# Patient Record
Sex: FEMALE | Race: WHITE | NOT HISPANIC OR LATINO | Employment: FULL TIME | RURAL
[De-identification: names, ages, dates, MRNs, and addresses within clinical notes are randomized per-mention and may not be internally consistent; named-entity substitution may affect disease eponyms.]

---

## 2021-02-11 ENCOUNTER — HISTORICAL (OUTPATIENT)
Dept: ADMINISTRATIVE | Facility: HOSPITAL | Age: 56
End: 2021-02-11

## 2021-02-12 LAB
INSULIN SERPL-ACNC: NORMAL U[IU]/ML

## 2021-11-22 ENCOUNTER — HOSPITAL ENCOUNTER (OUTPATIENT)
Dept: RADIOLOGY | Facility: HOSPITAL | Age: 56
Discharge: HOME | End: 2021-11-22

## 2021-11-22 VITALS — HEIGHT: 66 IN | WEIGHT: 184 LBS | BODY MASS INDEX: 29.57 KG/M2

## 2021-11-22 DIAGNOSIS — Z12.31 VISIT FOR SCREENING MAMMOGRAM: ICD-10-CM

## 2021-11-22 DIAGNOSIS — Z09 FOLLOW-UP EXAM: ICD-10-CM

## 2021-11-22 PROCEDURE — 77066 DX MAMMO INCL CAD BI: CPT | Mod: 26,,, | Performed by: RADIOLOGY

## 2021-11-22 PROCEDURE — 77066 DX MAMMO INCL CAD BI: CPT | Mod: TC

## 2021-11-22 PROCEDURE — 77066 MAMMO DIGITAL DIAGNOSTIC BILAT: ICD-10-PCS | Mod: 26,,, | Performed by: RADIOLOGY

## 2023-01-18 ENCOUNTER — HOSPITAL ENCOUNTER (OUTPATIENT)
Dept: RADIOLOGY | Facility: HOSPITAL | Age: 58
Discharge: HOME | End: 2023-01-18
Attending: RADIOLOGY

## 2023-01-18 DIAGNOSIS — R92.8 ABNORMAL MAMMOGRAM: ICD-10-CM

## 2023-01-18 DIAGNOSIS — Z12.31 VISIT FOR SCREENING MAMMOGRAM: ICD-10-CM

## 2023-01-18 PROCEDURE — 77067 SCR MAMMO BI INCL CAD: CPT | Mod: 26,,, | Performed by: RADIOLOGY

## 2023-01-18 PROCEDURE — 77067 SCR MAMMO BI INCL CAD: CPT | Mod: TC

## 2023-01-18 PROCEDURE — 76641 ULTRASOUND BREAST COMPLETE: CPT | Mod: TC,50

## 2023-01-18 PROCEDURE — 77067 MAMMO DIGITAL SCREENING BILAT: ICD-10-PCS | Mod: 26,,, | Performed by: RADIOLOGY

## 2023-01-18 PROCEDURE — 76641 US BREAST BILATERAL COMPLETE: ICD-10-PCS | Mod: 26,50,, | Performed by: RADIOLOGY

## 2023-01-18 PROCEDURE — 76641 ULTRASOUND BREAST COMPLETE: CPT | Mod: 26,50,, | Performed by: RADIOLOGY

## 2024-05-15 ENCOUNTER — HOSPITAL ENCOUNTER (OUTPATIENT)
Dept: RADIOLOGY | Facility: HOSPITAL | Age: 59
Discharge: HOME | End: 2024-05-15
Attending: RADIOLOGY
Payer: COMMERCIAL

## 2024-05-15 DIAGNOSIS — N63.10 BREAST MASS, RIGHT: Primary | ICD-10-CM

## 2024-05-15 DIAGNOSIS — R92.8 ABNORMAL MAMMOGRAM OF RIGHT BREAST: ICD-10-CM

## 2024-05-15 DIAGNOSIS — R92.8 ABNORMAL MAMMOGRAM: ICD-10-CM

## 2024-05-15 DIAGNOSIS — Z12.31 VISIT FOR SCREENING MAMMOGRAM: ICD-10-CM

## 2024-05-15 PROCEDURE — 76641 ULTRASOUND BREAST COMPLETE: CPT | Mod: 26,50,, | Performed by: RADIOLOGY

## 2024-05-15 PROCEDURE — 77063 BREAST TOMOSYNTHESIS BI: CPT | Mod: TC

## 2024-05-15 PROCEDURE — 77067 SCR MAMMO BI INCL CAD: CPT | Mod: TC

## 2024-05-15 PROCEDURE — 77061 BREAST TOMOSYNTHESIS UNI: CPT | Mod: TC,RT

## 2024-05-15 PROCEDURE — 76641 ULTRASOUND BREAST COMPLETE: CPT | Mod: TC,50

## 2024-05-15 PROCEDURE — 77065 DX MAMMO INCL CAD UNI: CPT | Mod: 26,RT,, | Performed by: RADIOLOGY

## 2024-05-15 PROCEDURE — 77067 SCR MAMMO BI INCL CAD: CPT | Mod: 26,,, | Performed by: RADIOLOGY

## 2024-05-15 PROCEDURE — 77065 DX MAMMO INCL CAD UNI: CPT | Mod: TC,RT

## 2024-05-15 PROCEDURE — 77063 BREAST TOMOSYNTHESIS BI: CPT | Mod: 26,,, | Performed by: RADIOLOGY

## 2024-05-15 PROCEDURE — 77061 BREAST TOMOSYNTHESIS UNI: CPT | Mod: 26,RT,, | Performed by: RADIOLOGY

## 2024-06-17 ENCOUNTER — CLINICAL SUPPORT (OUTPATIENT)
Dept: CARDIOLOGY | Facility: CLINIC | Age: 59
End: 2024-06-17
Attending: SURGERY
Payer: COMMERCIAL

## 2024-06-17 ENCOUNTER — OFFICE VISIT (OUTPATIENT)
Dept: SURGERY | Facility: CLINIC | Age: 59
End: 2024-06-17
Attending: SURGERY
Payer: COMMERCIAL

## 2024-06-17 DIAGNOSIS — N63.10 BREAST MASS, RIGHT: ICD-10-CM

## 2024-06-17 DIAGNOSIS — Z01.818 PRE-PROCEDURAL EXAMINATION: Primary | ICD-10-CM

## 2024-06-17 DIAGNOSIS — Z01.818 PRE-PROCEDURAL EXAMINATION: ICD-10-CM

## 2024-06-17 PROCEDURE — 99214 OFFICE O/P EST MOD 30 MIN: CPT | Mod: PBBFAC | Performed by: SURGERY

## 2024-06-17 PROCEDURE — 99999 PR PBB SHADOW E&M-EST. PATIENT-LVL II: CPT | Mod: PBBFAC,,,

## 2024-06-17 PROCEDURE — 93010 ELECTROCARDIOGRAM REPORT: CPT | Mod: S$PBB,,, | Performed by: STUDENT IN AN ORGANIZED HEALTH CARE EDUCATION/TRAINING PROGRAM

## 2024-06-17 PROCEDURE — 1159F MED LIST DOCD IN RCRD: CPT | Mod: CPTII,,, | Performed by: SURGERY

## 2024-06-17 PROCEDURE — 93005 ELECTROCARDIOGRAM TRACING: CPT | Mod: PBBFAC | Performed by: STUDENT IN AN ORGANIZED HEALTH CARE EDUCATION/TRAINING PROGRAM

## 2024-06-17 PROCEDURE — 99999 PR PBB SHADOW E&M-EST. PATIENT-LVL IV: CPT | Mod: PBBFAC,,, | Performed by: SURGERY

## 2024-06-17 PROCEDURE — 99212 OFFICE O/P EST SF 10 MIN: CPT | Mod: PBBFAC,25

## 2024-06-17 PROCEDURE — 99214 OFFICE O/P EST MOD 30 MIN: CPT | Mod: S$PBB,,, | Performed by: SURGERY

## 2024-06-17 RX ORDER — SODIUM CHLORIDE 9 MG/ML
INJECTION, SOLUTION INTRAVENOUS CONTINUOUS
Status: CANCELLED | OUTPATIENT
Start: 2024-06-17

## 2024-06-17 RX ORDER — CEFAZOLIN SODIUM 2 G/50ML
2 SOLUTION INTRAVENOUS
Status: CANCELLED | OUTPATIENT
Start: 2024-06-17

## 2024-06-17 NOTE — LETTER
June 17, 2024      [facility] - General Surgery  [address]  [facility] [address]  Phone: [phone]  Fax: [phone]       Patient: Krystal Khan   YOB: 1965  Date of Visit: 06/17/2024    To Whom It May Concern:    EDITH Khan  was at [facility] on 06/17/2024. The patient may return to work/school on 6/18/2024 with no restrictions. If you have any questions or concerns, or if I can be of further assistance, please do not hesitate to contact me.    Sincerely,    Dr. Vinicius Soto

## 2024-06-17 NOTE — PROGRESS NOTES
General Surgery History and Physical      Patient ID: Krystal Khan is a 59 y.o. female.    Chief Complaint: Breast Problem      HPI:  59-year-old female known to the service status post a previous biopsy on the left side which was benign about 5 years ago.  Comes in with a new mammogram finding of a right-sided abnormality.  She denies any kind of symptoms no breast pain, nipple discharge, skin changes, no changes in her pain.  Mammogram last year was normal.  She does take a hormone replacement drug which she stopped recently.    Review of Systems   Constitutional:  Negative for activity change, appetite change, fatigue and fever.   HENT:  Negative for trouble swallowing.    Respiratory:  Negative for cough and shortness of breath.    Cardiovascular:  Negative for chest pain and palpitations.   Gastrointestinal:  Negative for abdominal distention, abdominal pain, blood in stool, constipation and diarrhea.   Genitourinary:  Negative for flank pain.   Musculoskeletal:  Negative for neck pain and neck stiffness.   Neurological:  Negative for weakness.       No current outpatient medications on file.     No current facility-administered medications for this visit.       Review of patient's allergies indicates:  No Known Allergies    History reviewed. No pertinent past medical history.    Past Surgical History:   Procedure Laterality Date    BREAST BIOPSY      HYSTERECTOMY      OOPHORECTOMY         Family History   Problem Relation Name Age of Onset    Breast cancer Maternal Aunt         Social History     Socioeconomic History    Marital status:        There were no vitals filed for this visit.    Physical Exam  Constitutional:       General: She is not in acute distress.  HENT:      Head: Normocephalic.   Cardiovascular:      Rate and Rhythm: Normal rate and regular rhythm.      Pulses: Normal pulses.    Pulmonary:      Effort: Pulmonary effort is normal. No respiratory distress.      Breath sounds: Normal breath sounds.   Abdominal:      General: Abdomen is flat. There is no distension.      Palpations: Abdomen is soft.      Tenderness: There is no abdominal tenderness.   Musculoskeletal:         General: Normal range of motion.   Skin:     General: Skin is warm.   Neurological:      General: No focal deficit present.      Mental Status: She is oriented to person, place, and time.     Narrative & Impression  Result:  Mammo Digital Screening Bilat w/ Yosi  US Breast Bilateral Complete  Mammo Digital Diagnostic Right with Yosi     History:  Patient is 59 y.o. and is seen for a screening mammogram.  Previous excisional biopsy for a 1.3 cm area of distortion in the medial left breast on 02/11/2001-benign sclerosing lesion, florid usual ductal hyperplasia and cyst formation   On hormone therapy      Films Compared:11/07/2016 through 01/18/2023      Findings:  The breasts are heterogeneously dense, which may obscure small masses.  No interval changes have occurred in the left breast.       Additional diagnostic lateral and exaggerated views were obtained of the right breast for a 1.4 cm area of distortion noted in the 10 o'clock position 6 cm radially from the nipple.  Specifically, the distortion is 5 cm posterior, 5 cm superior, and 2.5 cm lateral to the nipple without a definite central mass.  No microcalcifications are seen.      A breast examination was performed and the tissues are lumpy although no discrete, fixed masses were palpable.  Ultrasound of the right breast revealed an area of shadowing in the 10 o'clock position 5 cm radially that measures approximately 1.2 x 1.4 cm.  The lesion is very subtle by ultrasound.  No abnormalities are present in the right axilla.   Ultrasound of the left breast and axilla was normal.      Impression:   There is a 1.5 cm area of distortion in the 10 o'clock position of the  right breast with sonographic features suggestive of a radial scar.  A lobular carcinoma would be in the differential.  Biopsy is recommended.  The lesion is too subtle for ultrasound-guided core biopsy.  Needle localization excisional biopsy is recommended.  The patient will follow-up with Dr. Miles Soto for further treatment planning on Tuesday, June 4, 2024 at 13:00.      BI-RADS CATEGORY 4B - Suspicious abnormality - biopsy should be considered. Moderate suspicion for malignancy.     Recommendation:  Biopsy is recommended.     Your estimated lifetime risk of breast cancer (to age 85) based on Tyrer-Cuzick risk assessment model is 16.22%.  According to the American Cancer Society, patients with a lifetime breast cancer risk of 20% or higher might benefit from supplemental screening tests, such as screening breast MRI.     Cc: PINO Porras       Assessment & Plan:    Pre-procedural examination  -     EKG 12-lead; Future  -     Basic Metabolic Panel; Future; Expected date: 06/17/2024  -     CBC Auto Differential; Future; Expected date: 06/17/2024  -     Ambulatory Referral to External Surgery  -     US Needle Loc with Ultrasound 1st site; Future; Expected date: 06/17/2024    Breast mass, right  -     Ambulatory referral/consult to General Surgery  -     Ambulatory Referral to External Surgery  -     US Needle Loc with Ultrasound 1st site; Future; Expected date: 06/17/2024        Patient to go to the OR for needle localized excision of her right breast mass next Thursday at the surgery center.\.  Risks and benefits explained to the patient clear risk of bleeding, infection, open operation, injury to surrounding organs, possible need for additional operations, possible missing lesion.  All questions were answered.

## 2024-06-17 NOTE — PATIENT INSTRUCTIONS
[facility]  [address] [place] [address]      YOUR SURGERY DATE IS Thursday June 27th 2024    LABWORK AND EKG IS SCHEDULED FOR today. PLEASE SIGN IN ON 1ST FLOOR   OF THE [facility] FOR LAB.  EKG IS LOCATED ON 2ND FLOOR.      DO NOT EAT OR DRINK ANYTHING AFTER MIDNIGHT BEFORE YOUR SURGERY    BRING ALL MEDICATIONS YOU ARE CURRENTLY TAKING WITH YOU.      Medication instructions:  You may take blood pressure medication with a small drink of water the morning of surgery.    IF YOU ARE ON ANY OF THESE BLOOD THINNERS, MAKE SURE YOUR PHYSICIAN IS AWARE.  Eliquis/Apixaban            Wafarin/Coumadin,Jantoven  Xarelto/Rivaroxaban      Pletal/Cilostazol  Plavix/Clopidogrel          Pradaxa/Dibigatran    If you are diabetic    Follow the diabetic medicine instructions you received during your pre-operative visit.  DO NOT take your insulin or diabetic medications the morning of surgery.  When you arrive at the surgical center, be sure to tell the nurse you are diabetic.    The following blood sugar medications have to be stopped prior to surgery:    Hold 24 hours prior to surgery:    Libraglutide - Saxenda   Adlyxen - Lixixerutose  Byetta - Exenatide  Saxenda - Liralutide   Victoza    Hold 1 week prior to surgery:    Semaglutide - Ozempic, Wegouy, Rybelsus  Dulaglutide - Trulicity  Tiazepatide - Mounjaro  Bydurcon    Hold 48 hours prior to surgery:    Metformin, Glucovance, Metaglip, Fortamet, Glucophage, Riomet, Avandamet, Glimepiride    You will receive at text or call from [facility] regarding your surgery date and time. Please complete your registration at this time. If you have questions, or you are unable to complete this registration, please call [facility] at [phone] to secure your surgery date and time.    A STAFF MEMBER FROM THE [facility] WILL CALL YOU THE DAY BEFORE  SURGERY TO LET YOU KNOW WHAT TIME TO ARRIVE THE MORNING OF  SURGERY.  IF YOU HAVE NOT HEARD FROM THEM BY 4 P.M. THE DAY BEFORE YOUR SURGERY,   PLEASE CALL THEM -[phone].      IF YOU HAVE ANY QUESTIONS, PLEASE CONTACT OUR OFFICE -[phone].  IF YOU HAVE FMLA FORMS, PLEASE CONTACT OUR ,KISHA WILKINS,-[phone], EMAIL [email or web address], OR FAX NUMBER,[phone]

## 2024-06-21 LAB
OHS QRS DURATION: 84 MS
OHS QTC CALCULATION: 428 MS

## 2024-06-26 ENCOUNTER — TELEPHONE (OUTPATIENT)
Dept: SURGERY | Facility: CLINIC | Age: 59
End: 2024-06-26
Payer: COMMERCIAL

## 2024-06-26 PROCEDURE — 88360 TUMOR IMMUNOHISTOCHEM/MANUAL: CPT | Mod: 26,,, | Performed by: PATHOLOGY

## 2024-06-26 PROCEDURE — 88342 IMHCHEM/IMCYTCHM 1ST ANTB: CPT | Mod: TC,SUR | Performed by: SURGERY

## 2024-06-26 PROCEDURE — 88341 IMHCHEM/IMCYTCHM EA ADD ANTB: CPT | Mod: 26,,, | Performed by: PATHOLOGY

## 2024-06-26 PROCEDURE — 88307 TISSUE EXAM BY PATHOLOGIST: CPT | Mod: 26,,, | Performed by: PATHOLOGY

## 2024-06-26 PROCEDURE — 88342 IMHCHEM/IMCYTCHM 1ST ANTB: CPT | Mod: 26,XU,, | Performed by: PATHOLOGY

## 2024-06-26 PROCEDURE — 88307 TISSUE EXAM BY PATHOLOGIST: CPT | Mod: TC,SUR | Performed by: SURGERY

## 2024-06-26 PROCEDURE — 88360 TUMOR IMMUNOHISTOCHEM/MANUAL: CPT | Mod: TC,SUR | Performed by: SURGERY

## 2024-06-26 NOTE — TELEPHONE ENCOUNTER
Returned pt phone call and informed her that she would receive her work excuse after surgery. Pt denies further questions at this time

## 2024-06-26 NOTE — TELEPHONE ENCOUNTER
----- Message from Gina Urrutia sent at 6/26/2024  3:08 PM CDT -----  Who Called: Krystal Khan      Pt is needing a work excuse , and work release with restrictions. Would like to follow up w nurse     Preferred Method of Contact: Phone Call  Patient's Preferred Phone Number on File: [phone]   Best Call Back Number, if different:  Additional Information:

## 2024-06-27 ENCOUNTER — OUTSIDE PLACE OF SERVICE (OUTPATIENT)
Dept: SURGERY | Facility: CLINIC | Age: 59
End: 2024-06-27
Payer: COMMERCIAL

## 2024-06-27 ENCOUNTER — LAB REQUISITION (OUTPATIENT)
Dept: LAB | Facility: HOSPITAL | Age: 59
End: 2024-06-27
Payer: COMMERCIAL

## 2024-06-27 ENCOUNTER — HOSPITAL ENCOUNTER (OUTPATIENT)
Dept: RADIOLOGY | Facility: HOSPITAL | Age: 59
Discharge: HOME | End: 2024-06-27
Attending: SURGERY
Payer: COMMERCIAL

## 2024-06-27 DIAGNOSIS — Z01.818 PRE-PROCEDURAL EXAMINATION: ICD-10-CM

## 2024-06-27 DIAGNOSIS — R92.8 ABNORMAL MAMMOGRAM: ICD-10-CM

## 2024-06-27 DIAGNOSIS — N63.0 BREAST LUMP: ICD-10-CM

## 2024-06-27 DIAGNOSIS — N63.10 UNSPECIFIED LUMP IN THE RIGHT BREAST, UNSPECIFIED QUADRANT: ICD-10-CM

## 2024-06-27 DIAGNOSIS — N63.10 BREAST MASS, RIGHT: ICD-10-CM

## 2024-06-27 PROCEDURE — 19281 PERQ DEVICE BREAST 1ST IMAG: CPT

## 2024-06-27 PROCEDURE — 76098 X-RAY EXAM SURGICAL SPECIMEN: CPT | Mod: TC

## 2024-06-27 PROCEDURE — 19281 PERQ DEVICE BREAST 1ST IMAG: CPT | Mod: RT,,, | Performed by: RADIOLOGY

## 2024-06-27 PROCEDURE — 76098 X-RAY EXAM SURGICAL SPECIMEN: CPT | Mod: 26,,, | Performed by: RADIOLOGY

## 2024-07-02 ENCOUNTER — OFFICE VISIT (OUTPATIENT)
Dept: SURGERY | Facility: CLINIC | Age: 59
End: 2024-07-02
Attending: SURGERY
Payer: COMMERCIAL

## 2024-07-02 DIAGNOSIS — Z09 FOLLOW-UP EXAMINATION, FOLLOWING OTHER SURGERY: Primary | ICD-10-CM

## 2024-07-02 LAB
DHEA SERPL-MCNC: NORMAL
ESTROGEN SERPL-MCNC: NORMAL PG/ML
INSULIN SERPL-ACNC: NORMAL U[IU]/ML
LAB AP CLINICAL INFORMATION: NORMAL
LAB AP GROSS DESCRIPTION: NORMAL
LAB AP LABORATORY NOTES: NORMAL
T3RU NFR SERPL: NORMAL %

## 2024-07-02 PROCEDURE — 99212 OFFICE O/P EST SF 10 MIN: CPT | Mod: PBBFAC | Performed by: SURGERY

## 2024-07-02 PROCEDURE — 1159F MED LIST DOCD IN RCRD: CPT | Mod: CPTII,,, | Performed by: SURGERY

## 2024-07-02 PROCEDURE — 99024 POSTOP FOLLOW-UP VISIT: CPT | Mod: ,,, | Performed by: SURGERY

## 2024-07-02 PROCEDURE — 99999 PR PBB SHADOW E&M-EST. PATIENT-LVL II: CPT | Mod: PBBFAC,,, | Performed by: SURGERY

## 2024-07-02 RX ORDER — PREDNISOLONE ACETATE 10 MG/ML
1 SUSPENSION/ DROPS OPHTHALMIC 4 TIMES DAILY
COMMUNITY
Start: 2024-02-29

## 2024-07-02 RX ORDER — LATANOPROST 50 UG/ML
SOLUTION/ DROPS OPHTHALMIC
COMMUNITY
Start: 2024-06-22

## 2024-07-02 RX ORDER — OFLOXACIN 3 MG/ML
1 SOLUTION/ DROPS OPHTHALMIC 4 TIMES DAILY
COMMUNITY
Start: 2024-02-29

## 2024-07-02 RX ORDER — DORZOLAMIDE HYDROCHLORIDE AND TIMOLOL MALEATE 20; 5 MG/ML; MG/ML
SOLUTION/ DROPS OPHTHALMIC
COMMUNITY
Start: 2024-06-01

## 2024-07-02 NOTE — LETTER
July 2, 2024      [facility] - General Surgery  [address]  [facility] [address]  Phone: [phone]  Fax: [phone]       Patient: Krystal Khan   YOB: 1965  Date of Visit: 07/02/2024    To Whom It May Concern:    EDITH Khan  was at [facility] on 07/02/2024. The patient may return to work/school on 7/2/2024  with lifting restrictions for one more weeks. If you have any questions or concerns, or if I can be of further assistance, please do not hesitate to contact me.    Sincerely,    Dr. Vinicius Soto

## 2024-07-02 NOTE — PROGRESS NOTES
Post-operative Note    HPI:  The patient is status post right breast mass excision.  Doing well overall. Mild soreness    PHYSICAL EXAM:    Incision well healed c/d/I, mild echymosis    Recent Results (from the past 504 hour(s))   EKG 12-lead    Collection Time: 06/17/24  3:53 PM   Result Value Ref Range    QRS Duration 84 ms    [facility] QTC Calculation 428 ms   Basic Metabolic Panel    Collection Time: 06/17/24  4:02 PM   Result Value Ref Range    Sodium 138 136 - 145 mmol/L    Potassium 3.9 3.5 - 5.1 mmol/L    Chloride 107 98 - 107 mmol/L    CO2 28 21 - 32 mmol/L    Anion Gap 7 7 - 16 mmol/L    Glucose 97 74 - 106 mg/dL    BUN 10 7 - 18 mg/dL    Creatinine 0.74 0.55 - 1.02 mg/dL    BUN/Creatinine Ratio 14 6 - 20    Calcium 8.7 8.5 - 10.1 mg/dL    eGFR 93 >=60 mL/min/1.73m2   CBC with Differential    Collection Time: 06/17/24  4:02 PM   Result Value Ref Range    WBC 5.66 4.50 - 11.00 K/uL    RBC 4.19 (L) 4.20 - 5.40 M/uL    Hemoglobin 11.6 (L) 12.0 - 16.0 g/dL    Hematocrit 35.6 (L) 38.0 - 47.0 %    MCV 85.0 80.0 - 96.0 fL    MCH 27.7 27.0 - 31.0 pg    MCHC 32.6 32.0 - 36.0 g/dL    RDW 13.6 11.5 - 14.5 %    Platelet Count 269 150 - 400 K/uL    MPV 11.5 9.4 - 12.4 fL    Neutrophils % 61.8 53.0 - 65.0 %    Lymphocytes % 26.9 (L) 27.0 - 41.0 %    Monocytes % 8.5 (H) 2.0 - 6.0 %    Eosinophils % 1.9 1.0 - 4.0 %    Basophils % 0.5 0.0 - 1.0 %    Immature Granulocytes % 0.4 0.0 - 0.4 %    nRBC, Auto 0.0 <=0.0 %    Neutrophils, Abs 3.50 1.80 - 7.70 K/uL    Lymphocytes, Absolute 1.52 1.00 - 4.80 K/uL    Monocytes, Absolute 0.48 0.00 - 0.80 K/uL    Eosinophils, Absolute 0.11 0.00 - 0.50 K/uL    Basophils, Absolute 0.03 0.00 - 0.20 K/uL    Immature Granulocytes, Absolute 0.02 0.00 - 0.04 K/uL    nRBC, Absolute 0.00 <=0.00 x10e3/uL    Diff Type Auto    Surgical Pathology    Collection Time: 06/26/24 10:25 AM   Result Value Ref Range    Case Report        Surgical Pathology                                Case: [id number]                                   Authorizing Provider:  Vinicius Soto MD       Collected:           06/26/2024 10:25 AM          Ordering Location:     [facility] -     Received:            06/27/2024 10:44 AM                                 Laboratory                                                                   Pathologist:           Romario Beatty MD                                                           Specimen:    Breast, Right, Right Breast Mass with wire Localization                                    Final Diagnosis       A. Right breast mass, needle localized excisional biopsy:  - Complex sclerosing lesion of the breast  - Usual/florid ductal hyperplasia  - Associated microcalcifications  - See comments      Comments       Myosin immunohistochemistry is performed on blocks A1, A2, A3, A4, and A5.  There is no evidence of invasion/microinvasion.  Immunohistochemistry for cytokeratin 5/6 and estrogen receptors is performed on blocks A2 and A3.  There is no evidence of an in-situ lesion.    Grecia Whyte and Herrera have each reviewed selected portions of this case, and they agree with the findings.      Gross Description       A. Breast, Right: Right Breast Mass with wire Localization  The specimen is received fresh designated Breast, Right and consists of a wire-localized excisional biopsy measuring 5.6 x 3.8 x 1.3 cm.  The specimen is oriented as follows:  Short superior, long lateral, and double deep.  The margins are differentially inked as follows:  Superior blue, inferior green, lateral orange, medial red, anterior yellow, posterior/deep black.  Specimen radiographs are included demonstrating a centrally located ill-defined radiodensity.  Sectioning reveals dense white-tan fibrous tissue surrounding the needle localization.  There is dense fibrous tissue approaching all margins except superior inked  blue.  Additionally, there is a 0.3 cm well-circumscribed white-tan nodule approaching the lateral margin inked orange.  No definitive masses or other abnormalities are identified.  The specimen is sectioned and placed in formalin at 11:00 a.m..  Representative sections are submitted as follows: A1 tissue surrounding needle, A2 immediately adjacent tissue, A3 and A4 fibrous tissue from elsewhere, A5 nodule approaching lateral margin.    Fixative: 10% Neutral Buffered Formalin  Cold Ischemia Time:  0 hours 35 minutes  Fixation Time:  10 hours 0 minutes    Grossing was completed by Miles Mccullough.      Microscopic Description       A microscopic examination was performed and the diagnosis reflects the findings.          Clinical Information       Right Breast Mass with Wire Localization      Laboratory Notes       If this report includes immunohistochemical (IHC) test results, please note the following: IHC studies were interpreted in conjunction with appropriate positive and negative controls which demonstrate the expected positive and negative reactivity. This laboratory is regulated under CLIA as qualified to perform high-complexity testing. IHC tests are used for clinical purposes. They should not be regarded as investigational or research.            ASSESSMENT:      The patient is doing well after surgery.       PLAN:      Follow up PRN.

## 2024-10-24 ENCOUNTER — OFFICE VISIT (OUTPATIENT)
Dept: ORTHOPEDICS | Facility: CLINIC | Age: 59
End: 2024-10-24
Payer: OTHER MISCELLANEOUS

## 2024-10-24 ENCOUNTER — HOSPITAL ENCOUNTER (OUTPATIENT)
Dept: RADIOLOGY | Facility: HOSPITAL | Age: 59
Discharge: HOME | End: 2024-10-24
Attending: ORTHOPAEDIC SURGERY
Payer: OTHER MISCELLANEOUS

## 2024-10-24 DIAGNOSIS — M79.672 FOOT PAIN, LEFT: Primary | ICD-10-CM

## 2024-10-24 DIAGNOSIS — S92.355A CLOSED NONDISPLACED FRACTURE OF FIFTH METATARSAL BONE OF LEFT FOOT, INITIAL ENCOUNTER: Primary | ICD-10-CM

## 2024-10-24 DIAGNOSIS — M79.672 FOOT PAIN, LEFT: ICD-10-CM

## 2024-10-24 PROCEDURE — 28470 CLTX METATARSAL FX WO MNP EA: CPT | Mod: S$PBB,LT,, | Performed by: ORTHOPAEDIC SURGERY

## 2024-10-24 PROCEDURE — 99999 PR PBB SHADOW E&M-EST. PATIENT-LVL II: CPT | Mod: PBBFAC,,, | Performed by: ORTHOPAEDIC SURGERY

## 2024-10-24 PROCEDURE — 99212 OFFICE O/P EST SF 10 MIN: CPT | Mod: PBBFAC,25 | Performed by: ORTHOPAEDIC SURGERY

## 2024-10-24 PROCEDURE — 99999PBSHW PR PBB SHADOW TECHNICAL ONLY FILED TO HB: Mod: PBBFAC,,,

## 2024-10-24 PROCEDURE — 28470 CLTX METATARSAL FX WO MNP EA: CPT | Mod: PBBFAC | Performed by: ORTHOPAEDIC SURGERY

## 2024-10-24 PROCEDURE — 99203 OFFICE O/P NEW LOW 30 MIN: CPT | Mod: S$PBB,57,, | Performed by: ORTHOPAEDIC SURGERY

## 2024-10-24 PROCEDURE — 73630 X-RAY EXAM OF FOOT: CPT | Mod: TC,LT

## 2024-10-24 NOTE — LETTER
October 24, 2024      [facility]  [address] [place] [address]  Phone: [phone]  Fax: [phone]       Patient: Krystal Khan   YOB: 1965  Date of Visit: 10/24/2024    To Whom It May Concern:    EDITH Khan  was at [facility] on 10/24/2024. The patient may return to work/school on 10/25/24 with no restrictions. If you have any questions or concerns, or if I can be of further assistance, please do not hesitate to contact me.    Sincerely,    Carol Cueavs III, M.D.

## 2024-10-24 NOTE — PROGRESS NOTES
CC:   Chief Complaint   Patient presents with    Left Hand - Post-op Evaluation     LT FOOT 5TH MT FX - WC         PREVIOUS INFO:        HISTORY:   10/24/2024    Krystal Khan  is a 59 y.o. worker's comp she works in [employer] at [employer] yesterday      PAST MEDICAL HISTORY: No past medical history on file.       PAST SURGICAL HISTORY:   Past Surgical History:   Procedure Laterality Date    BREAST BIOPSY      HYSTERECTOMY      OOPHORECTOMY            ALLERGIES: Review of patient's allergies indicates:  No Known Allergies     MEDICATIONS :    Current Outpatient Medications:     dorzolamide-timolol 2-0.5% (COSOPT) 22.3-6.8 mg/mL ophthalmic solution, INSTILL 1 DROP INTO EACH EYE TWICE DAILY AS DIRECTED, Disp: , Rfl:     latanoprost 0.005 % ophthalmic solution, INSTILL 1 DROP INTO EACH EYE ONCE DAILY AT NIGHT AS DIRECTED, Disp: , Rfl:     ofloxacin (OCUFLOX) 0.3 % ophthalmic solution, Place 1 drop into the right eye 4 (four) times daily., Disp: , Rfl:     prednisoLONE acetate (PRED FORTE) 1 % DrpS, Place 1 drop into the right eye 4 (four) times daily., Disp: , Rfl:      SOCIAL HISTORY:   Social History     Socioeconomic History    Marital status:         ROS    FAMILY HISTORY:   Family History   Problem Relation Name Age of Onset    Breast cancer Maternal Aunt            PHYSICAL EXAM: There were no vitals filed for this visit.            There is no height or weight on file to calculate BMI.     In general, this is a well-developed, well-nourished female . The patient is alert, oriented and cooperative.      HEENT:  Normocephalic, atraumatic.  Extraocular movements are intact bilaterally.  The oropharynx is benign.       NECK:  Nontender with good range of motion.      PULMONARY: Respirations are even and non-labored.       CARDIOVASCULAR: Pulses regular by peripheral palpation.       ABDOMEN:  Soft, non-tender, non-distended.        EXTREMITIES:  Ankle is really nontender  she is tender on the foot laterally this is her left foot the remainder of the foot is nontender    Ortho Exam      RADIOGRAPHIC FINDINGS:  Left foot AP lateral oblique views there is a 5th metatarsal base fracture minimally displaced in the metaphyseal region there is also on the lateral view seen ossicles of the talonavicular joint appear to be old rounded      .      IMPRESSION: Closed nondisplaced fracture of fifth metatarsal bone of left foot, initial encounter         PLAN:  Short-leg fiberglass cast  Coated aspirin 1 a day        No follow-ups on file.         Tino Cuevas III      (Subject to voice recognition error, transcription service not allowed)

## 2024-11-25 DIAGNOSIS — S92.355A CLOSED NONDISPLACED FRACTURE OF FIFTH METATARSAL BONE OF LEFT FOOT, INITIAL ENCOUNTER: Primary | ICD-10-CM

## 2024-11-26 ENCOUNTER — HOSPITAL ENCOUNTER (OUTPATIENT)
Dept: RADIOLOGY | Facility: HOSPITAL | Age: 59
Discharge: HOME | End: 2024-11-26
Attending: ORTHOPAEDIC SURGERY
Payer: OTHER MISCELLANEOUS

## 2024-11-26 ENCOUNTER — OFFICE VISIT (OUTPATIENT)
Dept: ORTHOPEDICS | Facility: CLINIC | Age: 59
End: 2024-11-26
Payer: OTHER MISCELLANEOUS

## 2024-11-26 DIAGNOSIS — S92.355A CLOSED NONDISPLACED FRACTURE OF FIFTH METATARSAL BONE OF LEFT FOOT, INITIAL ENCOUNTER: ICD-10-CM

## 2024-11-26 DIAGNOSIS — Z09 FOLLOW-UP EXAMINATION, FOLLOWING OTHER SURGERY: Primary | ICD-10-CM

## 2024-11-26 PROCEDURE — 99999PBSHW PR PBB SHADOW TECHNICAL ONLY FILED TO HB: Mod: PBBFAC,,,

## 2024-11-26 PROCEDURE — 73630 X-RAY EXAM OF FOOT: CPT | Mod: TC,LT

## 2024-11-26 PROCEDURE — 29405 APPL SHORT LEG CAST: CPT | Mod: PBBFAC | Performed by: ORTHOPAEDIC SURGERY

## 2024-11-26 PROCEDURE — 99212 OFFICE O/P EST SF 10 MIN: CPT | Mod: PBBFAC,25 | Performed by: ORTHOPAEDIC SURGERY

## 2024-11-26 PROCEDURE — 99999 PR PBB SHADOW E&M-EST. PATIENT-LVL II: CPT | Mod: PBBFAC,,, | Performed by: ORTHOPAEDIC SURGERY

## 2024-11-26 PROCEDURE — 73630 X-RAY EXAM OF FOOT: CPT | Mod: 26,LT,, | Performed by: ORTHOPAEDIC SURGERY

## 2024-11-26 NOTE — PROGRESS NOTES
CC:    Chief Complaint   Patient presents with    Left Hand - Post-op Evaluation     LT FOOT 5TH MT FX 10/24- 5 WEEKS - WC                                                                                                                                                CC:    Chief Complaint   Patient presents with    Left Hand - Post-op Evaluation     LT FOOT 5TH MT FX 10/24- 5 WEEKS - WC            Previos History :     HISTORY:   10/24/2024    Krystal Khan  is a 59 y.o. worker's comp she works in [employer] at [employer] lot yesterday         History:  11/26/2024   Krystal Khan is a 59 y.o.  status post follow-up 5th metatarsal base fracture has been in a short-leg cast        PE:   She has some tenderness to palpation of the 5th metatarsal base but skin is intact      Radiology:  Left foot AP lateral oblique views there is normal bone mineralization is fracture involving 5th metatarsal base in metaphyseal region slight widening of the fracture compared to previous films overall good alignment        Ass/Plan:  Reviewed the x-rays with the patient told him he then we still treat this conservatively but to go very easy on it keep her weight off follow-up x-ray in 3 weeks new short-leg fiberglass cast today x-rays out of the cast        Tino Cuevas III, MD    Subject to voice recognition errors,  transcription services are not allowed

## 2024-12-11 DIAGNOSIS — S92.355A CLOSED NONDISPLACED FRACTURE OF FIFTH METATARSAL BONE OF LEFT FOOT, INITIAL ENCOUNTER: Primary | ICD-10-CM

## 2024-12-12 ENCOUNTER — HOSPITAL ENCOUNTER (OUTPATIENT)
Dept: RADIOLOGY | Facility: HOSPITAL | Age: 59
Discharge: HOME | End: 2024-12-12
Attending: ORTHOPAEDIC SURGERY
Payer: OTHER MISCELLANEOUS

## 2024-12-12 ENCOUNTER — OFFICE VISIT (OUTPATIENT)
Dept: ORTHOPEDICS | Facility: CLINIC | Age: 59
End: 2024-12-12
Payer: OTHER MISCELLANEOUS

## 2024-12-12 DIAGNOSIS — S92.355A CLOSED NONDISPLACED FRACTURE OF FIFTH METATARSAL BONE OF LEFT FOOT, INITIAL ENCOUNTER: ICD-10-CM

## 2024-12-12 DIAGNOSIS — Z09 FOLLOW-UP EXAMINATION, FOLLOWING OTHER SURGERY: Primary | ICD-10-CM

## 2024-12-12 PROCEDURE — 73630 X-RAY EXAM OF FOOT: CPT | Mod: TC,LT

## 2024-12-12 PROCEDURE — 99212 OFFICE O/P EST SF 10 MIN: CPT | Mod: PBBFAC,25 | Performed by: ORTHOPAEDIC SURGERY

## 2024-12-12 PROCEDURE — 99999 PR PBB SHADOW E&M-EST. PATIENT-LVL II: CPT | Mod: PBBFAC,,, | Performed by: ORTHOPAEDIC SURGERY

## 2024-12-12 PROCEDURE — 99024 POSTOP FOLLOW-UP VISIT: CPT | Mod: ,,, | Performed by: ORTHOPAEDIC SURGERY

## 2024-12-12 NOTE — LETTER
December 12, 2024      [facility]  [address] [place] [address]  Phone: [phone]  Fax: [phone]       Patient: Krystal Khan   YOB: 1965  Date of Visit: 12/12/2024    To Whom It May Concern:    EDITH Khan  was at [facility] on 12/12/2024. The patient may return to work/school on 12/12/2024 with restrictions. Cannot weight bear on left foot. If you have any questions or concerns, or if I can be of further assistance, please do not hesitate to contact me.    Sincerely,  Tino Cuevas III, M.D.

## 2024-12-12 NOTE — PROGRESS NOTES
CC:    Chief Complaint   Patient presents with    Follow-up     LT FOOT 5TH MT FX 10/24- 7 WEEKS OOC            Previos History :  HISTORY:   10/24/2024    Krystal Khan  is a 59 y.o. worker's comp she works in [employer] at [employer] lot yesterday   Worker's comp  Fifth metatarsal base fracture  Date of injury October 23, 2024        History:  11/26/2024   Krystal Khan is a 59 y.o.  status post follow-up 5th metatarsal base fracture has been in a short-leg cast           History:  12/12/2024   Krystal Khan is a 59 y.o.  status post cast is removed today        PE:   Mildly tender today over the 5th metatarsal base      Radiology:  Left foot AP lateral oblique views normal metatarsal tarsal alignment there is a 5th metatarsal fracture oblique fracture involving the metaphysis fracture appears to be healing in acceptable alignment        Ass/Plan:  Long discussion with the patient we are going to put her in a boot but still going to put her weight on it protect this follow-up x-ray 3-4 weeks        Tino Cuevas III, MD    Subject to voice recognition errors,  transcription services are not allowed

## 2025-01-09 ENCOUNTER — HOSPITAL ENCOUNTER (OUTPATIENT)
Dept: RADIOLOGY | Facility: HOSPITAL | Age: 60
Discharge: HOME | End: 2025-01-09
Attending: ORTHOPAEDIC SURGERY
Payer: OTHER MISCELLANEOUS

## 2025-01-09 ENCOUNTER — OFFICE VISIT (OUTPATIENT)
Dept: ORTHOPEDICS | Facility: CLINIC | Age: 60
End: 2025-01-09
Payer: OTHER MISCELLANEOUS

## 2025-01-09 DIAGNOSIS — Z09 FOLLOW-UP EXAMINATION, FOLLOWING OTHER SURGERY: Primary | ICD-10-CM

## 2025-01-09 DIAGNOSIS — S92.355A CLOSED NONDISPLACED FRACTURE OF FIFTH METATARSAL BONE OF LEFT FOOT, INITIAL ENCOUNTER: ICD-10-CM

## 2025-01-09 DIAGNOSIS — S92.355A CLOSED NONDISPLACED FRACTURE OF FIFTH METATARSAL BONE OF LEFT FOOT, INITIAL ENCOUNTER: Primary | ICD-10-CM

## 2025-01-09 PROCEDURE — 99999 PR PBB SHADOW E&M-EST. PATIENT-LVL II: CPT | Mod: PBBFAC,,, | Performed by: ORTHOPAEDIC SURGERY

## 2025-01-09 PROCEDURE — 99024 POSTOP FOLLOW-UP VISIT: CPT | Mod: ,,, | Performed by: ORTHOPAEDIC SURGERY

## 2025-01-09 PROCEDURE — 99212 OFFICE O/P EST SF 10 MIN: CPT | Mod: PBBFAC,25 | Performed by: ORTHOPAEDIC SURGERY

## 2025-01-09 PROCEDURE — 73630 X-RAY EXAM OF FOOT: CPT | Mod: TC,LT

## 2025-01-09 NOTE — LETTER
January 9, 2025      [facility]  [address] [place] [address]  Phone: [phone]  Fax: [phone]       Patient: Krystal Khan   YOB: 1965  Date of Visit: 01/09/2025    To Whom It May Concern:    EDITH Khan  was at [facility] on 01/09/2025. The patient may return to work/school on 1/10/25 - gradually increase activities as tolerated. If you have any questions or concerns, or if I can be of further assistance, please do not hesitate to contact me.    Sincerely,    Carol Cuevas III, M.D.

## 2025-01-09 NOTE — PROGRESS NOTES
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  status post 11 weeks out 5th metatarsal base fracture she has been in a boot most recently still keeping her weight off        PE:   Nontender over the 5th metatarsal base      Radiology:  Left foot AP lateral oblique views normal metatarsal tarsal alignment there is a 5th metatarsal base fracture progressively healing        Ass/Plan:  I will let her use the boot but let her put some weight through it now gradually increasing the weight-bearing and we will see her back in 3 weeks x-rays of the left foot any problems prior let us know immediately        Tino Cuevas III, MD    Subject to voice recognition errors,  transcription services are not allowed

## 2025-01-27 DIAGNOSIS — S92.355A CLOSED NONDISPLACED FRACTURE OF FIFTH METATARSAL BONE OF LEFT FOOT, INITIAL ENCOUNTER: Primary | ICD-10-CM

## 2025-01-28 ENCOUNTER — HOSPITAL ENCOUNTER (OUTPATIENT)
Dept: RADIOLOGY | Facility: HOSPITAL | Age: 60
Discharge: HOME | End: 2025-01-28
Attending: ORTHOPAEDIC SURGERY
Payer: OTHER MISCELLANEOUS

## 2025-01-28 ENCOUNTER — OFFICE VISIT (OUTPATIENT)
Dept: ORTHOPEDICS | Facility: CLINIC | Age: 60
End: 2025-01-28
Payer: OTHER MISCELLANEOUS

## 2025-01-28 DIAGNOSIS — S92.355A CLOSED NONDISPLACED FRACTURE OF FIFTH METATARSAL BONE OF LEFT FOOT, INITIAL ENCOUNTER: ICD-10-CM

## 2025-01-28 DIAGNOSIS — Z09 FOLLOW-UP EXAMINATION, FOLLOWING OTHER SURGERY: Primary | ICD-10-CM

## 2025-01-28 PROCEDURE — 73630 X-RAY EXAM OF FOOT: CPT | Mod: 26,LT,, | Performed by: ORTHOPAEDIC SURGERY

## 2025-01-28 PROCEDURE — 99999 PR PBB SHADOW E&M-EST. PATIENT-LVL II: CPT | Mod: PBBFAC,,, | Performed by: ORTHOPAEDIC SURGERY

## 2025-01-28 PROCEDURE — 99212 OFFICE O/P EST SF 10 MIN: CPT | Mod: PBBFAC,25 | Performed by: ORTHOPAEDIC SURGERY

## 2025-01-28 PROCEDURE — 99024 POSTOP FOLLOW-UP VISIT: CPT | Mod: ,,, | Performed by: ORTHOPAEDIC SURGERY

## 2025-01-28 PROCEDURE — 73630 X-RAY EXAM OF FOOT: CPT | Mod: TC,LT

## 2025-01-28 NOTE — LETTER
January 28, 2025      [facility]  [address] [place] [address]  Phone: [phone]  Fax: [phone]       Patient: Krystal Khan   YOB: 1965  Date of Visit: 01/28/2025    To Whom It May Concern:    EDITH Khan  was at [facility] on 01/28/2025. The patient may return to work/school on 1/19/25 use tennis shoe for 6 weeks. If you have any questions or concerns, or if I can be of further assistance, please do not hesitate to contact me.    Sincerely,    Carol Cuevas III, M.D.

## 2025-01-28 NOTE — PROGRESS NOTES
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  status post 3 months out 5th metatarsal base fracture left foot        PE:   Nontender on exam 5th metatarsal base      Radiology:  Left foot AP lateral oblique views there is normal bone mineralization normal metatarsal tarsal alignment progressively healing 5th metatarsal base fracture        Ass/Plan:  Thinks she had going to good tennis shoe good arch support good cushion this is worker's comp so we are going to say for 6 weeks with a tennis shoe we will see her back p.r.n.        Tino Cuevas III, MD    Subject to voice recognition errors,  transcription services are not allowed

## 2025-01-28 NOTE — LETTER
January 28, 2025      [facility]  [address] [place] [id number]  Phone: [phone]  Fax: [phone]       Patient: Krystal Khan   YOB: 1965  Date of Visit: 01/28/2025    To Whom It May Concern:    EDITH Khan  was at [facility] on 01/28/2025. The patient may return to work/school on 1/29/25 with no restrictions. If you have any questions or concerns, or if I can be of further assistance, please do not hesitate to contact me.    Sincerely,    Carol Cuevas III, M.D.

## 2025-07-09 ENCOUNTER — HOSPITAL ENCOUNTER (OUTPATIENT)
Dept: RADIOLOGY | Facility: HOSPITAL | Age: 60
Discharge: HOME | End: 2025-07-09
Attending: RADIOLOGY
Payer: COMMERCIAL

## 2025-07-09 DIAGNOSIS — Z12.31 VISIT FOR SCREENING MAMMOGRAM: ICD-10-CM

## 2025-07-09 PROCEDURE — 77067 SCR MAMMO BI INCL CAD: CPT | Mod: TC

## 2025-07-09 PROCEDURE — 77063 BREAST TOMOSYNTHESIS BI: CPT | Mod: 26,,, | Performed by: RADIOLOGY

## 2025-07-09 PROCEDURE — 77067 SCR MAMMO BI INCL CAD: CPT | Mod: 26,,, | Performed by: RADIOLOGY
